# Patient Record
Sex: MALE | Race: WHITE | NOT HISPANIC OR LATINO | Employment: OTHER | URBAN - METROPOLITAN AREA
[De-identification: names, ages, dates, MRNs, and addresses within clinical notes are randomized per-mention and may not be internally consistent; named-entity substitution may affect disease eponyms.]

---

## 2024-08-22 ENCOUNTER — OFFICE VISIT (OUTPATIENT)
Dept: PLASTIC SURGERY | Facility: CLINIC | Age: 74
End: 2024-08-22
Payer: COMMERCIAL

## 2024-08-22 DIAGNOSIS — J34.829 NASAL VALVE COLLAPSE: ICD-10-CM

## 2024-08-22 DIAGNOSIS — J34.2 NASAL SEPTAL DEVIATION: ICD-10-CM

## 2024-08-22 DIAGNOSIS — J34.3 HYPERTROPHY OF INFERIOR NASAL TURBINATE: ICD-10-CM

## 2024-08-22 DIAGNOSIS — J34.89 NASAL OBSTRUCTION: Primary | ICD-10-CM

## 2024-08-22 NOTE — PROGRESS NOTES
Duane is in on referral from Dr. Shun Mathis.  He has great difficulty breathing through his nose and it is more problematic on the right than the left.  Dr. Armendariz has put in a comprehensive note and I concur with his commentary.  Briefly he had a septoplasty more than 20 years ago and in 2019 had turbinate reductive surgery.  That most recent procedure provided some transient improvement but he has significant difficulties.  He has been using silicone nasal stents with partial improvement.  He has also tried Breathe Right strips and they have had very limited benefit as well.  He has had extensive allergy testing that was negative.  I agree that he has elements of vasomotor rhinitis and with that some ongoing medical therapy will be important.  However he has had a long trial of multiple simultaneous medications that do not provide a reasonable airway.  He has significant structural deformities of the nose.  These include very thick heavy sebaceous skin that causes counter rotation of the nasal tip and narrowing of the internal and external nasal valves.  The sidewalls of the lower third of the nose of the collapsed inward as well both due to the lack of structural support in the midline and adequate support of the nasal ala.  I would recommend an open approach with a caudal septal replacement graft.  We might be able to harvest sufficient septum from posteriorly to advance forward and we project the nose.  Should there not be sufficient cartilage I would use a banked rib graft.  He would also need very firm curved batten grafts and I would harvest this from the ear and we may find that he needs cartilage from both the ears to provide sufficient support.  Same time I would again reduce his turbinates.  His nasal mucosa is somewhat thick.  Palpation suggest there are some septal almond still present posteriorly.  But the caudal septum is markedly deficient.  I think it is a reasonable goal to improve his airway but  I do not think he can ever be ideal.  He has priorities in healthcare today are to get his prostate problems corrected first and then address his nasal obstruction.  With that in mind I would be happy to see him again in December as he sees the urologist sometime in November.  A copy of this note will be forwarded to Dr. Mathis so he is aware of our thinking.  We are very flattered that he suggested that the patient visit with us. We spent more 30 minutes in consultation  JEREMIAH KANG MD

## 2024-08-22 NOTE — LETTER
8/22/2024       RE: Duane John Tellers  [address] [place] [address]     Dear Colleague,    Thank you for referring your patient, Duane John Tellers, to the [facility] at [facility]. Please see a copy of my visit note below.    Facial Plastic and Reconstructive Surgery Consultation    Referring Provider:   Shun Mathis MD  [address]  [address]  [place] [address]      HPI:  I had the pleasure of seeing Duane John Tellers today for   Duane John Tellers is a 73 year old male who was referred by Shun Mathis for nasal obstruction    Mr. Kc is a 73-year-old gentleman who presents with nasal obstruction.  This has been increasingly bothersome to him over the last 10 years.  He states that it primarily bothers him while he is working and while he is sleeping.  He reports right worse than left nasal obstruction.  He has undergone 2 prior interventions for this including a in office turbinate ablation in 2019 and a prior septoplasty with Dr. Ureña was performed over 20 years ago.  He was recently seen by Shun Mathis who noted evidence of vasomotor rhinitis and nasal valve stenosis. He trialed him on topical nasal steroids without significant relief of the symptoms. 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 he feels that his symptoms are primarily resolved when he supports his nasal tip and pulls his right cheek laterally and a Stanley maneuver.    Past therapies tried:  In office turbinate coblation done 03/21/2019 for turbinate hypertrophy, which was quite helpful until symptoms returned this summer. Fluticasone, breathe right strips, antihistamines, Zyrtec, Astelin, and Nasacort tried before turbinate reduction without success. Also breathe right strips, antihistamines, zyrtec, Astelin, Nasacort, saline mist, ear flush done by PCP. Stated that he  also had surgery with Dr Jimenez over 20 years then was seen by Dr Muro.     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 He last saw his cardiologist in 10/2023 and remains on Amioadrone. HTN stable on losartan and hydrochlorothiazide,.    Nasal Trauma:Denies  Allergies:Denies   Topical Medications: As above    Review Of Systems  ROS: Negative other than the symptoms noted above in the HPI.    There is no problem list on file for this patient.    No past surgical history on file.  No current outpatient medications on file.     Amiodarone  Hydrochlorothiazide  Trazadone  Finasteride  Losartan      Patient has no known allergies.  Social History     Socioeconomic History     Marital status: Patient Declined     Spouse name: Not on file     Number of children: Not on file     Years of education: Not on file     Highest education level: Not on file   Occupational History     Not on file   Tobacco Use     Smoking status: Not on file     Smokeless tobacco: Not on file   Substance and Sexual Activity     Alcohol use: Not on file     Drug use: Not on file     Sexual activity: Not on file   Other Topics Concern     Not on file   Social History Narrative     Not on file     Social Determinants of Health     Financial Resource Strain: High Risk (1/1/2022)    Received from [employer]    Financial Resource Strain      Difficulty of Paying Living Expenses: Not on file      Difficulty of Paying Living Expenses: Not on file   Food Insecurity: Not on file   Transportation Needs: Not on file   Physical Activity: Not on file   Stress: Not on file   Social Connections: Unknown (1/1/2022)    Received from [employer]    Social Connections      Frequency of Communication with Friends and Family: Not on file   Interpersonal Safety: Not on file   Housing Stability: Not on file     No family history on file.    PE:  Alert and Oriented,  Answering Questions Appropriately  Atraumatic, Normocephalic, Face Symmetric  Skin: Johnson 3, Skin Thickness: Thick sebaceous skin  Facial Nerve Intact and facial movement symmetric  EOM's, PEERL  Nasal Exam:   Front View: Long nose with wide lower third   Deviation: Minimal   Tip: Ptotic nasal tip bordering on rhinophymatous skin, underrotated nose   Side View:     Tip Support: Weak    Alar Tip Ratio: 1:1   Base View: Ptotic tip with external valve collapse   Anterior Rhinoscopy: Septum: Relatively straight (mildly convex to the right side) palpation reveals a good amount of cartilage remains present     Nasal Mucosa: Boggy mucosa     Recurvature of Lateral Crura: Present.  Chin: Normal   Lips/Teeth/Toungue/Gums: Lips intact, Normal Dentition, Occlusion intact, Oral mucosa intact, no lesions/ulcerations/masses, Tongue mobile  Neck: No lymphadenopathy, no thyromegaly, trachea midline  Chest: No wheezing, cyanosis, or stridor  Card: Normal upper extremity pulses and capillary refill, not diaphoretic  Neuro/Psych: CN's 2-12 intact, Moves all extremities, ambulation in intact, positive affect, no notable muscle weakness        PROCEDURE:Rigid nasal endoscopy   Indication: Nasal obstruction, chronic rhinitis  Performed by: MD Juarez Roach MD    Nasal Endoscopy is performed to provide a more thorough evaluation of the nasal airway than seen on standard nasal speculum exam.  Findings are as follows:   Nasal passages: No polyps, crusting, masses, or lesions   Nasal Septum: Boggy, congested nasal septal swell body   Middle Meatus: clear, no mucopurulence bilaterally, no polyps   Nasopharynx: clear, no lesions, no crusting or inflammation      IMPRESSION:   Mr. 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 Our exam today was revealing of significant nasal valve stenosis secondary to his ptotic tip and lateral wall collapse.  Nasal endoscopy today ruled out any masses, polyps or evidence of sinusitis. We did  him that we could improve his symptoms with a nasal valve repair and septoplasty but he would likely require ongoing medical therapy for his vasomotor rhinitis. We discussed the risks, benefits, alternatives to surgery. We also discussed the sources of structural support needed including auricular cartilage harvest, septal cartilage, and potentially cadaveric rib cartilage    PLAN:  - Nasal vestibular stenosis repair (Batten grafts with auricular cartilage)  - Open septoplasty: would plan to pull the septum forward (extracorporeal septoplasty/caudal septal replacement graft to provide better tip support and rotate the tip) Possible cadaveric costal cartilage     Photodocumentation was obtained.      Juarez Armendariz MD  Fellow, Facial Plastic and Reconstructive Surgery              Duane is in on referral from Dr. Shun Mathis.  He has great difficulty breathing through his nose and it is more problematic on the right than the left.  Dr. Armendariz has put in a comprehensive note and I concur with his commentary.  Briefly he had a septoplasty more than 20 years ago and in 2019 had turbinate reductive surgery.  That most recent procedure provided some transient improvement but he has significant difficulties.  He has been using silicone nasal stents with partial improvement.  He has also tried Breathe Right strips and they have had very limited benefit as well.  He has had extensive allergy testing that was negative.  I agree that he has elements of vasomotor rhinitis and with that some ongoing medical therapy will be important.  However he has had a long trial of multiple simultaneous medications that do not provide a reasonable airway.  He has significant structural deformities of the nose.  These include very thick heavy sebaceous skin that causes counter rotation of the nasal tip and narrowing of the internal and external nasal  valves.  The sidewalls of the lower third of the nose of the collapsed inward as well both due to the lack of structural support in the midline and adequate support of the nasal ala.  I would recommend an open approach with a caudal septal replacement graft.  We might be able to harvest sufficient septum from posteriorly to advance forward and we project the nose.  Should there not be sufficient cartilage I would use a banked rib graft.  He would also need very firm curved batten grafts and I would harvest this from the ear and we may find that he needs cartilage from both the ears to provide sufficient support.  Same time I would again reduce his turbinates.  His nasal mucosa is somewhat thick.  Palpation suggest there are some septal almond still present posteriorly.  But the caudal septum is markedly deficient.  I think it is a reasonable goal to improve his airway but I do not think he can ever be ideal.  He has priorities in healthcare today are to get his prostate problems corrected first and then address his nasal obstruction.  With that in mind I would be happy to see him again in December as he sees the urologist sometime in November.  A copy of this note will be forwarded to Dr. Mathis so he is aware of our thinking.  We are very flattered that he suggested that the patient visit with us. We spent more 30 minutes in consultation  JEREMIAH KANG MD       Again, thank you for allowing me to participate in the care of your patient.      Sincerely,    JEREMIAH KANG MD

## 2024-08-22 NOTE — PROGRESS NOTES
Facial Plastic and Reconstructive Surgery Consultation    Referring Provider:   Shun Mathis MD  [address] [facility]  [address]  [place] [address]      HPI:  I had the pleasure of seeing Duane John Tellers today for   Duane John Tellers is a 73 year old male who was referred by Shun Mathis for nasal obstruction    Mr. Kc is a 73-year-old gentleman who presents with nasal obstruction.  This has been increasingly bothersome to him over the last 10 years.  He states that it primarily bothers him while he is working and while he is sleeping.  He reports right worse than left nasal obstruction.  He has undergone 2 prior interventions for this including a in office turbinate ablation in 2019 and a prior septoplasty with Dr. Ureña was performed over 20 years ago.  He was recently seen by Shun Mathis who noted evidence of vasomotor rhinitis and nasal valve stenosis. He trialed him on topical nasal steroids without significant relief of the symptoms. 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 he feels that his symptoms are primarily resolved when he supports his nasal tip and pulls his right cheek laterally and a Walton maneuver.    Past therapies tried:  In office turbinate coblation done 03/21/2019 for turbinate hypertrophy, which was quite helpful until symptoms returned this summer. Fluticasone, breathe right strips, antihistamines, Zyrtec, Astelin, and Nasacort tried before turbinate reduction without success. Also breathe right strips, antihistamines, zyrtec, Astelin, Nasacort, saline mist, ear flush done by PCP. Stated that he also had surgery with Dr Jimenez over 20 years then was seen by Dr Muro.     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 He last saw his cardiologist in 10/2023 and remains on Amioadrone. HTN stable on losartan and hydrochlorothiazide,.    Nasal  Trauma:Denies  Allergies:Denies   Topical Medications: As above    Review Of Systems  ROS: Negative other than the symptoms noted above in the HPI.    There is no problem list on file for this patient.    No past surgical history on file.  No current outpatient medications on file.     Amiodarone  Hydrochlorothiazide  Trazadone  Finasteride  Losartan      Patient has no known allergies.  Social History     Socioeconomic History    Marital status: Patient Declined     Spouse name: Not on file    Number of children: Not on file    Years of education: Not on file    Highest education level: Not on file   Occupational History    Not on file   Tobacco Use    Smoking status: Not on file    Smokeless tobacco: Not on file   Substance and Sexual Activity    Alcohol use: Not on file    Drug use: Not on file    Sexual activity: Not on file   Other Topics Concern    Not on file   Social History Narrative    Not on file     Social Determinants of Health     Financial Resource Strain: High Risk (1/1/2022)    Received from [employer]    Financial Resource Strain     Difficulty of Paying Living Expenses: Not on file     Difficulty of Paying Living Expenses: Not on file   Food Insecurity: Not on file   Transportation Needs: Not on file   Physical Activity: Not on file   Stress: Not on file   Social Connections: Unknown (1/1/2022)    Received from [employer]    Social Connections     Frequency of Communication with Friends and Family: Not on file   Interpersonal Safety: Not on file   Housing Stability: Not on file     No family history on file.    PE:  Alert and Oriented, Answering Questions Appropriately  Atraumatic, Normocephalic, Face Symmetric  Skin: Johnson 3, Skin Thickness: Thick sebaceous skin  Facial Nerve Intact and facial movement symmetric  EOM's, PEERL  Nasal Exam:   Front View: Long nose with wide lower third   Deviation: Minimal   Tip: Ptotic nasal  tip bordering on rhinophymatous skin, underrotated nose   Side View:     Tip Support: Weak    Alar Tip Ratio: 1:1   Base View: Ptotic tip with external valve collapse   Anterior Rhinoscopy: Septum: Relatively straight (mildly convex to the right side) palpation reveals a good amount of cartilage remains present     Nasal Mucosa: Boggy mucosa     Recurvature of Lateral Crura: Present.  Chin: Normal   Lips/Teeth/Toungue/Gums: Lips intact, Normal Dentition, Occlusion intact, Oral mucosa intact, no lesions/ulcerations/masses, Tongue mobile  Neck: No lymphadenopathy, no thyromegaly, trachea midline  Chest: No wheezing, cyanosis, or stridor  Card: Normal upper extremity pulses and capillary refill, not diaphoretic  Neuro/Psych: CN's 2-12 intact, Moves all extremities, ambulation in intact, positive affect, no notable muscle weakness        PROCEDURE:Rigid nasal endoscopy   Indication: Nasal obstruction, chronic rhinitis  Performed by: MD Juarez Roach MD    Nasal Endoscopy is performed to provide a more thorough evaluation of the nasal airway than seen on standard nasal speculum exam.  Findings are as follows:   Nasal passages: No polyps, crusting, masses, or lesions   Nasal Septum: Boggy, congested nasal septal swell body   Middle Meatus: clear, no mucopurulence bilaterally, no polyps   Nasopharynx: clear, no lesions, no crusting or inflammation      IMPRESSION:   Mr. 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 Our exam today was revealing of significant nasal valve stenosis secondary to his ptotic tip and lateral wall collapse. Nasal endoscopy today ruled out any masses, polyps or evidence of sinusitis. We did  him that we could improve his symptoms with a nasal valve repair and septoplasty but he would likely require ongoing medical therapy for his vasomotor rhinitis. We discussed the risks, benefits, alternatives to  surgery. We also discussed the sources of structural support needed including auricular cartilage harvest, septal cartilage, and potentially cadaveric rib cartilage    PLAN:  - Nasal vestibular stenosis repair (Batten grafts with auricular cartilage)  - Open septoplasty: would plan to pull the septum forward (extracorporeal septoplasty/caudal septal replacement graft to provide better tip support and rotate the tip) Possible cadaveric costal cartilage     Photodocumentation was obtained.      Juarez Armendariz MD  Fellow, Facial Plastic and Reconstructive Surgery

## 2024-08-22 NOTE — NURSING NOTE
Photo documentation obtained.    Obtained consent for nasal endoscopy.     Teressa Macario RN  08/22/24 3:50 PM

## 2024-08-22 NOTE — LETTER
August 22, 2024  Re: Duane John Tellers  1950    Dear Dr. Mathis,    Thank you so much for referring Duane John Tellers to the [facility]. I had the pleasure of visiting with Duane today.     Attached you will find a copy of my note. Please feel free to reach out to me with any questions, [phone].     Facial Plastic and Reconstructive Surgery Consultation    Referring Provider:   Shun Mathis MD  [address] [place] [address]  [address]  [place] [address]      HPI:  I had the pleasure of seeing Duane John Tellers today for   Duane John Tellers is a 73 year old male who was referred by Shun Mathis for nasal obstruction    Mr. Kc is a 73-year-old gentleman who presents with nasal obstruction.  This has been increasingly bothersome to him over the last 10 years.  He states that it primarily bothers him while he is working and while he is sleeping.  He reports right worse than left nasal obstruction.  He has undergone 2 prior interventions for this including a in office turbinate ablation in 2019 and a prior septoplasty with Dr. Ureña was performed over 20 years ago.  He was recently seen by Shun Mathis who noted evidence of vasomotor rhinitis and nasal valve stenosis. He trialed him on topical nasal steroids without significant relief of the symptoms. 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 he feels that his symptoms are primarily resolved when he supports his nasal tip and pulls his right cheek laterally and a [place] maneuver.    Past therapies tried:  In office turbinate coblation done 03/21/2019 for turbinate hypertrophy, which was quite helpful until symptoms returned this summer. Fluticasone, breathe right strips, antihistamines, Zyrtec, Astelin, and Nasacort tried before turbinate reduction without success. Also breathe right strips, antihistamines, zyrtec, Astelin, Nasacort, saline mist, ear flush done  by PCP. Stated that he also had surgery with Dr Jimenez over 20 years then was seen by Dr Muro.     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 He last saw his cardiologist in 10/2023 and remains on Amioadrone. HTN stable on losartan and hydrochlorothiazide,.    Nasal Trauma:Denies  Allergies:Denies   Topical Medications: As above    Review Of Systems  ROS: Negative other than the symptoms noted above in the HPI.    There is no problem list on file for this patient.    No past surgical history on file.  No current outpatient medications on file.     Amiodarone  Hydrochlorothiazide  Trazadone  Finasteride  Losartan      Patient has no known allergies.  Social History     Socioeconomic History     Marital status: Patient Declined     Spouse name: Not on file     Number of children: Not on file     Years of education: Not on file     Highest education level: Not on file   Occupational History     Not on file   Tobacco Use     Smoking status: Not on file     Smokeless tobacco: Not on file   Substance and Sexual Activity     Alcohol use: Not on file     Drug use: Not on file     Sexual activity: Not on file   Other Topics Concern     Not on file   Social History Narrative     Not on file     Social Determinants of Health     Financial Resource Strain: High Risk (1/1/2022)    Received from [facility]    Financial Resource Strain      Difficulty of Paying Living Expenses: Not on file      Difficulty of Paying Living Expenses: Not on file   Food Insecurity: Not on file   Transportation Needs: Not on file   Physical Activity: Not on file   Stress: Not on file   Social Connections: Unknown (1/1/2022)    Received from [facility]    Social Connections      Frequency of Communication with Friends and Family: Not on file   Interpersonal Safety: Not on file   Housing Stability: Not on file     No family history on file.    PE:  Alert  and Oriented, Answering Questions Appropriately  Atraumatic, Normocephalic, Face Symmetric  Skin: Johnson 3, Skin Thickness: Thick sebaceous skin  Facial Nerve Intact and facial movement symmetric  EOM's, PEERL  Nasal Exam:   Front View: Long nose with wide lower third   Deviation: Minimal   Tip: Ptotic nasal tip bordering on rhinophymatous skin, underrotated nose   Side View:     Tip Support: Weak    Alar Tip Ratio: 1:1   Base View: Ptotic tip with external valve collapse   Anterior Rhinoscopy: Septum: Relatively straight (mildly convex to the right side) palpation reveals a good amount of cartilage remains present     Nasal Mucosa: Boggy mucosa     Recurvature of Lateral Crura: Present.  Chin: Normal   Lips/Teeth/Toungue/Gums: Lips intact, Normal Dentition, Occlusion intact, Oral mucosa intact, no lesions/ulcerations/masses, Tongue mobile  Neck: No lymphadenopathy, no thyromegaly, trachea midline  Chest: No wheezing, cyanosis, or stridor  Card: Normal upper extremity pulses and capillary refill, not diaphoretic  Neuro/Psych: CN's 2-12 intact, Moves all extremities, ambulation in intact, positive affect, no notable muscle weakness        PROCEDURE:Rigid nasal endoscopy   Indication: Nasal obstruction, chronic rhinitis  Performed by: MD Juarez Roach MD    Nasal Endoscopy is performed to provide a more thorough evaluation of the nasal airway than seen on standard nasal speculum exam.  Findings are as follows:   Nasal passages: No polyps, crusting, masses, or lesions   Nasal Septum: Boggy, congested nasal septal swell body   Middle Meatus: clear, no mucopurulence bilaterally, no polyps   Nasopharynx: clear, no lesions, no crusting or inflammation      IMPRESSION:   Mr. 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 Nasal endoscopy today ruled out any masses, polyps or evidence of sinusitis. We did  him that we could improve his symptoms with a nasal valve repair and septoplasty but he would likely require ongoing medical therapy for his vasomotor rhinitis. We discussed the risks, benefits, alternatives to surgery. We also discussed the sources of structural support needed including auricular cartilage harvest, septal cartilage, and potentially cadaveric rib cartilage    PLAN:  - Nasal vestibular stenosis repair (Batten grafts with auricular cartilage)  - Open septoplasty: would plan to pull the septum forward (extracorporeal septoplasty/caudal septal replacement graft to provide better tip support and rotate the tip) Possible cadaveric costal cartilage     Photodocumentation was obtained.      Juarez Armendariz MD  Fellow, Facial Plastic and Reconstructive Surgery              Duane is in on referral from Dr. Shun Mathis.  He has great difficulty breathing through his nose and it is more problematic on the right than the left.  Dr. Armendariz has put in a comprehensive note and I concur with his commentary.  Briefly he had a septoplasty more than 20 years ago and in 2019 had turbinate reductive surgery.  That most recent procedure provided some transient improvement but he has significant difficulties.  He has been using silicone nasal stents with partial improvement.  He has also tried Breathe Right strips and they have had very limited benefit as well.  He has had extensive allergy testing that was negative.  I agree that he has elements of vasomotor rhinitis and with that some ongoing medical therapy will be important.  However he has had a long trial of multiple simultaneous medications that do not provide a reasonable airway.  He has significant structural deformities of the nose.  These include very thick heavy sebaceous skin that causes counter rotation of the nasal tip and narrowing of the internal and  external nasal valves.  The sidewalls of the lower third of the nose of the collapsed inward as well both due to the lack of structural support in the midline and adequate support of the nasal ala.  I would recommend an open approach with a caudal septal replacement graft.  We might be able to harvest sufficient septum from posteriorly to advance forward and we project the nose.  Should there not be sufficient cartilage I would use a banked rib graft.  He would also need very firm curved batten grafts and I would harvest this from the ear and we may find that he needs cartilage from both the ears to provide sufficient support.  Same time I would again reduce his turbinates.  His nasal mucosa is somewhat thick.  Palpation suggest there are some septal almond still present posteriorly.  But the caudal septum is markedly deficient.  I think it is a reasonable goal to improve his airway but I do not think he can ever be ideal.  He has priorities in healthcare today are to get his prostate problems corrected first and then address his nasal obstruction.  With that in mind I would be happy to see him again in December as he sees the urologist sometime in November.  A copy of this note will be forwarded to Dr. Mathis so he is aware of our thinking.  We are very flattered that he suggested that the patient visit with us. We spent more 30 minutes in consultation  JEREMIAH KANG MD         Your trust in our practice and care is much appreciated.    Sincerely,  JEREMIAH KANG MD

## 2024-08-27 ENCOUNTER — PREP FOR PROCEDURE (OUTPATIENT)
Dept: OTOLARYNGOLOGY | Facility: CLINIC | Age: 74
End: 2024-08-27

## 2024-08-27 DIAGNOSIS — J34.3 HYPERTROPHY OF BOTH INFERIOR NASAL TURBINATES: ICD-10-CM

## 2024-08-27 DIAGNOSIS — J34.829 NASAL VALVE COLLAPSE: ICD-10-CM

## 2024-08-27 DIAGNOSIS — J34.89 NASAL OBSTRUCTION: Primary | ICD-10-CM

## 2024-08-27 DIAGNOSIS — J34.89 NASAL VALVE STENOSIS: ICD-10-CM

## 2024-08-27 RX ORDER — CEFAZOLIN SODIUM 2 G/50ML
2 SOLUTION INTRAVENOUS
OUTPATIENT
Start: 2024-08-27

## 2024-08-27 RX ORDER — CEFAZOLIN SODIUM 2 G/50ML
2 SOLUTION INTRAVENOUS SEE ADMIN INSTRUCTIONS
OUTPATIENT
Start: 2024-08-27

## 2024-09-26 ENCOUNTER — TELEPHONE (OUTPATIENT)
Dept: PLASTIC SURGERY | Facility: CLINIC | Age: 74
End: 2024-09-26

## 2024-09-26 NOTE — TELEPHONE ENCOUNTER
Called patient to inform him that his surgery has been approved.     Left voicemail.     Maritza Flores, Surgical Coordinator  9/26/24  10:47 AM

## 2024-09-27 ENCOUNTER — TELEPHONE (OUTPATIENT)
Dept: PLASTIC SURGERY | Facility: CLINIC | Age: 74
End: 2024-09-27

## 2024-09-27 NOTE — TELEPHONE ENCOUNTER
Patient called back regarding scheduling surgery with Dr. Hwang.  He informed me that he has other medical concerns right now that are more pressing, and will not be able to schedule at this time.    He will call back when he is ready to schedule.     Maritza Flores, Surgical Coordinator  9/27/24